# Patient Record
Sex: FEMALE | Race: WHITE | Employment: FULL TIME | URBAN - METROPOLITAN AREA
[De-identification: names, ages, dates, MRNs, and addresses within clinical notes are randomized per-mention and may not be internally consistent; named-entity substitution may affect disease eponyms.]

---

## 2024-07-22 ENCOUNTER — TELEPHONE (OUTPATIENT)
Dept: OBSTETRICS AND GYNECOLOGY | Facility: CLINIC | Age: 39
End: 2024-07-22
Payer: COMMERCIAL

## 2024-07-22 NOTE — TELEPHONE ENCOUNTER
Patient called with breast tenderness that started on Friday, that now has developed into a lump covering her entire nipple. She stated she has Hidradenitis suppurativa, but has never had anything this large before. We do not have any openings until Aug. I informed her that she should go to urgent care due to the possibility of cellulitis and get treated, because we would not want that infection to spread, if that is what is going on. I scheduled her on our schedule for soonest available spot, but didn't know if you would want to order any imaging or anything. Or if you would want to try to see her sooner.     Please advise.

## 2024-07-23 NOTE — TELEPHONE ENCOUNTER
I called pt to see if she could come in Friday afternoon after the OB, but she is currently sitting in the ER now to be seen. I told her to call us back if she needs follow up. Can put her at the 1:45 spot if needed? The stretcher pt does not have transportation and will not be coming in, so its only the OB pt at 1 as of now.

## 2024-07-25 NOTE — TELEPHONE ENCOUNTER
Patient is still in hospital. It turns out she listened to me and didn't wait,  and I am so happy she went in. The infection did go sepsis. She is currently still admitted and on IV antibiotics. She is keeping her appt for 8/19 for follow up, but has other appt to make -as she also found out while admitted that she is now a type 2 diabetic. She will see you on the 8/19

## 2024-08-19 ENCOUNTER — APPOINTMENT (OUTPATIENT)
Dept: OBSTETRICS AND GYNECOLOGY | Facility: CLINIC | Age: 39
End: 2024-08-19
Payer: COMMERCIAL